# Patient Record
Sex: FEMALE | Race: WHITE | NOT HISPANIC OR LATINO | URBAN - METROPOLITAN AREA
[De-identification: names, ages, dates, MRNs, and addresses within clinical notes are randomized per-mention and may not be internally consistent; named-entity substitution may affect disease eponyms.]

---

## 2021-02-24 ENCOUNTER — EMERGENCY (EMERGENCY)
Facility: HOSPITAL | Age: 66
LOS: 0 days | Discharge: HOME | End: 2021-02-24
Attending: EMERGENCY MEDICINE
Payer: COMMERCIAL

## 2021-02-24 VITALS
RESPIRATION RATE: 18 BRPM | DIASTOLIC BLOOD PRESSURE: 94 MMHG | WEIGHT: 139.99 LBS | OXYGEN SATURATION: 98 % | TEMPERATURE: 99 F | SYSTOLIC BLOOD PRESSURE: 151 MMHG | HEART RATE: 110 BPM
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
## 2021-02-24 DIAGNOSIS — N20.0 CALCULUS OF KIDNEY: ICD-10-CM

## 2021-02-24 DIAGNOSIS — Z88.0 ALLERGY STATUS TO PENICILLIN: ICD-10-CM

## 2021-02-24 DIAGNOSIS — M54.2 CERVICALGIA: ICD-10-CM

## 2021-02-24 DIAGNOSIS — M54.9 DORSALGIA, UNSPECIFIED: ICD-10-CM

## 2021-02-24 DIAGNOSIS — S09.90XA UNSPECIFIED INJURY OF HEAD, INITIAL ENCOUNTER: ICD-10-CM

## 2021-02-24 DIAGNOSIS — F07.81 POSTCONCUSSIONAL SYNDROME: ICD-10-CM

## 2021-02-24 DIAGNOSIS — I70.0 ATHEROSCLEROSIS OF AORTA: ICD-10-CM

## 2021-02-24 DIAGNOSIS — K80.20 CALCULUS OF GALLBLADDER WITHOUT CHOLECYSTITIS WITHOUT OBSTRUCTION: ICD-10-CM

## 2021-02-24 DIAGNOSIS — K44.9 DIAPHRAGMATIC HERNIA WITHOUT OBSTRUCTION OR GANGRENE: ICD-10-CM

## 2021-02-24 PROCEDURE — 70450 CT HEAD/BRAIN W/O DYE: CPT

## 2021-02-24 PROCEDURE — 70450 CT HEAD/BRAIN W/O DYE: CPT | Mod: 26

## 2021-02-24 PROCEDURE — 99284 EMERGENCY DEPT VISIT MOD MDM: CPT

## 2021-02-24 PROCEDURE — 71250 CT THORAX DX C-: CPT

## 2021-02-24 PROCEDURE — 99284 EMERGENCY DEPT VISIT MOD MDM: CPT | Mod: 25

## 2021-02-24 PROCEDURE — 93010 ELECTROCARDIOGRAM REPORT: CPT

## 2021-02-24 PROCEDURE — 72125 CT NECK SPINE W/O DYE: CPT | Mod: 26

## 2021-02-24 PROCEDURE — 72125 CT NECK SPINE W/O DYE: CPT

## 2021-02-24 PROCEDURE — 93005 ELECTROCARDIOGRAM TRACING: CPT

## 2021-02-24 PROCEDURE — 71250 CT THORAX DX C-: CPT | Mod: 26

## 2021-02-24 RX ORDER — ACETAMINOPHEN 500 MG
650 TABLET ORAL ONCE
Refills: 0 | Status: COMPLETED | OUTPATIENT
Start: 2021-02-24 | End: 2021-02-24

## 2021-02-24 RX ADMIN — Medication 650 MILLIGRAM(S): at 18:01

## 2021-02-24 NOTE — ED STATDOCS - ATTENDING CONTRIBUTION TO CARE
I Senthil Martínez MD saw and examined the patient. MLP saw and examined the patient under my supervision. I discussed the care of the patient with MLP and agree with MLP's plan, assessment and care of the patient while in the ED.

## 2021-02-24 NOTE — ED STATDOCS - NEUROLOGICAL, MLM
sensation is normal and strength is normal. sensation is normal and strength is normal. NIH of 0. GCS of 15. Cranial Nerves II-XII intact. sensation is normal and strength is normal. NIH of 0. GCS of 15. Cranial Nerves II-XII intact. No saddle anesthesia.

## 2021-02-24 NOTE — ED STATDOCS - PATIENT PORTAL LINK FT
You can access the FollowMyHealth Patient Portal offered by [facility] by registering at the following website: [email or web address]. By joining [facility] FollowMyHealth portal, you will also be able to view your health information using other applications (apps) compatible with our system.

## 2021-02-24 NOTE — ED STATDOCS - MUSCULOSKELETAL, MLM
range of motion is not limited. +L trapezius TTP range of motion is not limited. +L trapezius TTP. No cervical spine tenderness. 5/5 strength on flexion and extension of all limbs. No nuchal rigidity.

## 2021-02-24 NOTE — ED STATDOCS - CARE PROVIDER_API CALL
Eric Buck (MD)  Cardiovascular Disease  [address], [address]  [place] [address]  Phone: [phone]  Fax: [phone]  Follow Up Time:
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
## 2021-02-24 NOTE — ED STATDOCS - CHPI ED SYMPTOM NEG
no burning urination/no fever/no abdominal distention/no blood in stool/no diarrhea/no dysuria/no palpitations
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
## 2021-02-24 NOTE — ED STATDOCS - EYES, MLM
clear bilaterally.  Pupils equal, round, and reactive to light. clear bilaterally.  Pupils equal, round, and reactive to light. Visual Fields intact x4.
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
## 2021-02-24 NOTE — ED STATDOCS - CARDIAC, MLM
normal rate, regular rhythm, and no murmur. normal rate, regular rhythm, and no gallops or rubs. normal rate, regular rhythm, and no gallops or rubs. 2+ pulses in bilateral dp and radial arteries.

## 2021-02-24 NOTE — ED STATDOCS - CONSTITUTIONAL, MLM
well appearing and in no apparent distress. normal... well appearing and in no apparent distress. Nontoxic appearing. NAD. AAOx4.
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
## 2021-04-27 NOTE — ED STATDOCS - NS_ ATTENDINGSCRIBEDETAILS _ED_A_ED_FT
Assessment: STD check. Plan: Will send urine gonorrhea, chlamydia. Will treat prophylactically due to girlfriend testing positive. Pt aware, will d/c with return precautions. I Senthil Martínez MD saw and examined the patient. Scribe documented for me and under my supervision. I have modified the scribe's documentation where necessary to reflect my history, physical exam and other relevant documentations pertinent to the care of the patient.
